# Patient Record
Sex: MALE | Race: OTHER | HISPANIC OR LATINO | Employment: UNEMPLOYED | URBAN - METROPOLITAN AREA
[De-identification: names, ages, dates, MRNs, and addresses within clinical notes are randomized per-mention and may not be internally consistent; named-entity substitution may affect disease eponyms.]

---

## 2022-10-30 ENCOUNTER — HOSPITAL ENCOUNTER (EMERGENCY)
Facility: HOSPITAL | Age: 52
Discharge: HOME | End: 2022-10-30
Attending: EMERGENCY MEDICINE

## 2022-10-30 VITALS
SYSTOLIC BLOOD PRESSURE: 112 MMHG | HEART RATE: 85 BPM | RESPIRATION RATE: 20 BRPM | WEIGHT: 225.97 LBS | DIASTOLIC BLOOD PRESSURE: 67 MMHG | OXYGEN SATURATION: 100 % | TEMPERATURE: 97.9 F

## 2022-10-30 DIAGNOSIS — K04.7 DENTAL ABSCESS: Primary | ICD-10-CM

## 2022-10-30 RX ORDER — AMOXICILLIN AND CLAVULANATE POTASSIUM 875; 125 MG/1; MG/1
1 TABLET, FILM COATED ORAL EVERY 12 HOURS
Qty: 20 TABLET | Refills: 0 | Status: SHIPPED | OUTPATIENT
Start: 2022-10-30 | End: 2022-11-09

## 2022-10-30 NOTE — ED PROVIDER NOTES
History  Chief Complaint   Patient presents with   • Facial Swelling     Here 3 days ago for dental/mouth pain, today returns d/t increased pain and now swelling to left lower jaw/mouth     Patient is a 78-year-old male coming in for 3 days of right-sided dental pain, is able to still be, and swell up, though is painful  Patient does have a headache  No nausea, no vomiting, no fevers  Right sided facial swelling noted      History provided by:  Patient   used: No    Dental Pain  Location:  Lower  Severity:  Mild  Duration:  3 days  Ineffective treatments:  Acetaminophen  Associated symptoms: facial swelling and headaches    Associated symptoms: no difficulty swallowing, no facial pain, no fever, no neck pain, no neck swelling and no trismus        None       History reviewed  No pertinent past medical history  History reviewed  No pertinent surgical history  History reviewed  No pertinent family history  I have reviewed and agree with the history as documented  E-Cigarette/Vaping     E-Cigarette/Vaping Substances     Social History     Tobacco Use   • Smoking status: Current Every Day Smoker     Packs/day: 0 50   Substance Use Topics   • Alcohol use: Never   • Drug use: Never       Review of Systems   Constitutional: Negative  Negative for activity change, appetite change and fever  HENT: Positive for dental problem and facial swelling  Eyes: Negative  Respiratory: Negative  Cardiovascular: Negative  Gastrointestinal: Negative  Negative for nausea  Genitourinary: Negative  Musculoskeletal: Negative  Negative for neck pain  Skin: Negative  Neurological: Positive for headaches  Psychiatric/Behavioral: Negative  Physical Exam  Physical Exam  Vitals reviewed  Constitutional:       Appearance: Normal appearance  He is normal weight  HENT:      Head: Normocephalic and atraumatic  Jaw: No trismus  Comments: No trismus    Significant right-sided facial swelling, indurated, no erythema, no fluctuance     Right Ear: External ear normal       Left Ear: External ear normal       Nose: Nose normal       Mouth/Throat:      Comments: No erythema, slight gum swelling noted on the right lower jaw  Eyes:      Conjunctiva/sclera: Conjunctivae normal    Cardiovascular:      Rate and Rhythm: Normal rate  Pulmonary:      Effort: Pulmonary effort is normal    Musculoskeletal:         General: Normal range of motion  Cervical back: Normal range of motion  Skin:     General: Skin is warm and dry  Neurological:      Mental Status: He is alert  Vital Signs  ED Triage Vitals [10/30/22 0907]   Temperature Pulse Respirations Blood Pressure SpO2   97 9 °F (36 6 °C) 85 20 112/67 100 %      Temp Source Heart Rate Source Patient Position - Orthostatic VS BP Location FiO2 (%)   Tympanic Monitor Sitting Left arm --      Pain Score       --           Vitals:    10/30/22 0907   BP: 112/67   Pulse: 85   Patient Position - Orthostatic VS: Sitting         Visual Acuity      ED Medications  Medications - No data to display    Diagnostic Studies  Results Reviewed     None                 No orders to display              Procedures  Procedures         ED Course                                             MDM  Number of Diagnoses or Management Options  Dental abscess: new and does not require workup  Diagnosis management comments: Patient is in no acute distress, comes in for evaluation of right-sided facial swelling for the last 2 days  Patient has no signs of systemic toxicity, will treat outpatient with Augmentin    Counseling: I had a detailed discussion with the patient and/or guardian regarding: the historical points, exam findings, and any diagnostic results supporting the discharge diagnosis, lab results, radiology results, discharge instructions reviewed with patient and/or family/caregiver and understanding was verbalized   Instructions given to return to the emergency department if symptoms worsen or persist, or if there are any questions or concerns that arise at home       All labs reviewed and utilized in the medical decision making process     All radiology studies independently viewed by me and interpreted by the radiologist     Portions of the record may have been created with voice recognition software   Occasional wrong word or "sound a like" substitutions may have occurred due to the inherent limitations of voice recognition software   Read the chart carefully and recognize, using context, where substitutions have occurred  Risk of Complications, Morbidity, and/or Mortality  Presenting problems: minimal  Diagnostic procedures: minimal  Management options: minimal    Patient Progress  Patient progress: stable      Disposition  Final diagnoses:   Dental abscess     Time reflects when diagnosis was documented in both MDM as applicable and the Disposition within this note     Time User Action Codes Description Comment    10/30/2022  9:14 AM Yeison Saleem Add [K04 7] Dental abscess       ED Disposition     ED Disposition   Discharge    Condition   Stable    Date/Time   Sun Oct 30, 2022  9:14 AM    Chadwick Sandoval discharge to home/self care  Follow-up Information     Follow up With Specialties Details Why Contact Info Additional [id number] [facility] Emergency Department Emergency Medicine  As needed, If symptoms worsen [id number] [facility] [phone] [address] [facility] Emergency Department          Discharge Medication List as of 10/30/2022  9:14 AM      START taking these medications    Details   amoxicillin-clavulanate (AUGMENTIN) 875-125 mg per tablet Take 1 tablet by mouth every 12 (twelve) hours for 10 days, Starting Sun 10/30/2022, Until Wed 11/9/2022, Normal             No discharge procedures on file      PDMP Review     None          ED Provider  Electronically Signed by           Gen Evans PA-C  10/30/22 1016

## 2022-10-30 NOTE — Clinical Note
Brenton Perez was seen and treated in our emergency department on 10/30/2022  No restrictions    ? ? Diagnosis: ?    Jean Marie  ? Marcus Giordano He may return on this date: 10/31/2022    ? If you have any questions or concerns, please don't hesitate to call        Jensen Rios PA-C    ______________________________           _______________          _______________  Hospital Representative                              Date                                Time

## 2022-10-30 NOTE — ED NOTES
Provider assessed and discharged patient  This RN had no interaction with the patient       January Sullivan RN  10/30/22 0880